# Patient Record
Sex: MALE | Race: WHITE | NOT HISPANIC OR LATINO | URBAN - METROPOLITAN AREA
[De-identification: names, ages, dates, MRNs, and addresses within clinical notes are randomized per-mention and may not be internally consistent; named-entity substitution may affect disease eponyms.]

---

## 2017-08-23 ENCOUNTER — EMERGENCY (EMERGENCY)
Facility: HOSPITAL | Age: 52
LOS: 0 days | Discharge: HOME | End: 2017-08-23

## 2017-08-23 DIAGNOSIS — L03.119 CELLULITIS OF UNSPECIFIED PART OF LIMB: ICD-10-CM

## 2017-08-23 DIAGNOSIS — M54.5 LOW BACK PAIN: ICD-10-CM

## 2017-08-23 DIAGNOSIS — Z79.899 OTHER LONG TERM (CURRENT) DRUG THERAPY: ICD-10-CM

## 2019-06-10 ENCOUNTER — OUTPATIENT (OUTPATIENT)
Dept: OUTPATIENT SERVICES | Facility: HOSPITAL | Age: 54
LOS: 1 days | Discharge: HOME | End: 2019-06-10
Payer: COMMERCIAL

## 2019-06-10 DIAGNOSIS — I25.10 ATHEROSCLEROTIC HEART DISEASE OF NATIVE CORONARY ARTERY WITHOUT ANGINA PECTORIS: ICD-10-CM

## 2019-06-10 PROCEDURE — 75574 CT ANGIO HRT W/3D IMAGE: CPT | Mod: 26

## 2019-06-21 ENCOUNTER — OUTPATIENT (OUTPATIENT)
Dept: OUTPATIENT SERVICES | Facility: HOSPITAL | Age: 54
LOS: 1 days | Discharge: HOME | End: 2019-06-21

## 2019-06-21 VITALS — WEIGHT: 240.08 LBS

## 2019-06-21 NOTE — CHART NOTE - NSCHARTNOTEFT_GEN_A_CORE
PRE-OP DIAGNOSIS: suspected CAD/Abnormal CCTA    PROCEDURE: [facility] with coronary angiography    Physician: APRIL Heredia MD  Assistant: Aj Lockwood    ANESTHESIA TYPE:  [ x ] Sedation  [ x ] Local/Regional    ESTIMATED BLOOD LOSS:    10   mL    CONDITION  [ x ]Good    IV CONTRAST:  60    mL    FINDINGS    Left Heart Catheterization:    Right dominant   Right radial 6 Fr   Radial D stat  LVEF%: 45  LVEDP: WNL  [ x ] Non-obstructive CAD                           Left main Normal    LAD: mild luminal irregularities, calcification                    Diag: Small vessel, mild ds    Left Circumflex: Large vessel, mild luminal irregularities  OM: Normal    Right Coronary Artery: Large vessel, prox 30%, calcification mild  RPDA mild luminal irregularities    POST-OP DIAGNOSIS  Non obstructive CAD    PLAN OF CARE  [x ] D/C Home today  c/w medical management PRE-OP DIAGNOSIS: suspected CAD/Abnormal CCTA    PROCEDURE: [facility] with coronary angiography    Physician: APRIL Heredia MD  Assistant: Aj Lockwood    ANESTHESIA TYPE:  [ x ] Sedation  [ x ] Local/Regional    ESTIMATED BLOOD LOSS:    10   mL    CONDITION  [ x ]Good    IV CONTRAST:  60    mL    FINDINGS    Left Heart Catheterization:    Right dominant   Right radial 6 Fr   Radial D stat  LVEF%: 45  LVEDP: WNL  [ x ] Non-obstructive CAD                           Left main Normal    LAD: mild luminal irregularities, calcification                    Diag: Small vessel, mild ds    Left Circumflex: Large vessel, mild luminal irregularities  OM: Normal    Right Coronary Artery: Large vessel, prox 30%, calcification mild  RPDA mild luminal irregularities    POST-OP DIAGNOSIS  Non obstructive CAD    PLAN OF CARE  [x ] D/C Home today  c/w medical management, Statins therapy

## 2019-06-21 NOTE — H&P CARDIOLOGY - HISTORY OF PRESENT ILLNESS
53 yrs old presented for elective LHC.  pt had CCTA and showed elevated calcium score.  VS WNL. 53 yrs old presented for elective LHC. strong fhx of CAD,   pt had CCTA and showed very high calcium score. 16oo Agatston score, about 600 in LAD, 600 in RCA and 390 in LCx  ex smoker  VS WNL.

## 2019-06-27 DIAGNOSIS — Z82.49 FAMILY HISTORY OF ISCHEMIC HEART DISEASE AND OTHER DISEASES OF THE CIRCULATORY SYSTEM: ICD-10-CM

## 2019-06-27 DIAGNOSIS — Z87.891 PERSONAL HISTORY OF NICOTINE DEPENDENCE: ICD-10-CM

## 2019-06-27 DIAGNOSIS — R07.89 OTHER CHEST PAIN: ICD-10-CM

## 2019-06-27 DIAGNOSIS — I25.10 ATHEROSCLEROTIC HEART DISEASE OF NATIVE CORONARY ARTERY WITHOUT ANGINA PECTORIS: ICD-10-CM

## 2020-06-24 PROBLEM — Z00.00 ENCOUNTER FOR PREVENTIVE HEALTH EXAMINATION: Status: ACTIVE | Noted: 2020-06-24

## 2020-06-25 ENCOUNTER — APPOINTMENT (OUTPATIENT)
Dept: SURGERY | Facility: CLINIC | Age: 55
End: 2020-06-25
Payer: COMMERCIAL

## 2020-06-25 VITALS — HEIGHT: 72 IN | BODY MASS INDEX: 33.59 KG/M2 | WEIGHT: 248 LBS

## 2020-06-25 PROCEDURE — 99203 OFFICE O/P NEW LOW 30 MIN: CPT

## 2020-06-25 NOTE — ASSESSMENT
[FreeTextEntry1] : Raffaele is a pleasant 54-year-old  and retired [employer] with a past medical history significant for hypercholesterolemia and psoriatic arthritis on Stelara SC every 12 weeks (last dose yesterday) presenting to the office referred by his wife Sis (who works at [employer]) complaining of pain and tenderness in the periumbilical region suspicious for a hernia. He occasionally does heavy lifting at work and enjoys lifting weights at home.\par \par Physical examination demonstrates a strawberry size tender bulge at the umbilicus which is not reducible consistent with a large incarcerated umbilical hernia warranting surgical repair. There is no evidence of strangulation and the patient denies any symptoms of obstruction. He does have a mild to moderate diastasis recti which is most likely related to his excess abdominal weight and of no significant clinical concern. His current BMI is 34.\par \par I explained the pros and cons of surgery, as well as all risks, benefits, indications and alternatives of the procedure and the patient understood and agreed. Raffaele was scheduled for the repair of his incarcerated umbilical hernia with mesh on Friday, August 7, 2020 (which represents the midpoint between his 2 closest Stelara injections) under LOCAL with IV SEDATION at [facility] with presurgical testing waived.  He was encouraged to avoid heavy lifting and strenuous activity in the interim, of course.

## 2020-06-25 NOTE — CONSULT LETTER
[Dear  ___] : Dear  [unfilled], [Courtesy Letter:] : I had the pleasure of seeing your patient, [unfilled], in my office today. [Please see my note below.] : Please see my note below. [Consult Closing:] : Thank you very much for allowing me to participate in the care of this patient.  If you have any questions, please do not hesitate to contact me. [FreeTextEntry3] : Respectfully,\par \par Uriel Lee M.D., FACS\par  [DrGeoffrey  ___] : Dr. TAYLOR [DrGeoffrey ___] : Dr. TAYOLR

## 2020-06-25 NOTE — PHYSICAL EXAM
[JVD] : no jugular venous distention  [Normal Breath Sounds] : Normal breath sounds [No Rash or Lesion] : No rash or lesion [Alert] : alert [Calm] : calm [de-identified] : overweight [de-identified] : mildly protuberant abdomen, mild diastasis recti\par  [de-identified] : incarcerated umbilical hernia [de-identified] : normal

## 2020-08-04 ENCOUNTER — LABORATORY RESULT (OUTPATIENT)
Age: 55
End: 2020-08-04

## 2020-08-04 ENCOUNTER — OUTPATIENT (OUTPATIENT)
Dept: OUTPATIENT SERVICES | Facility: HOSPITAL | Age: 55
LOS: 1 days | Discharge: HOME | End: 2020-08-04

## 2020-08-04 DIAGNOSIS — Z11.59 ENCOUNTER FOR SCREENING FOR OTHER VIRAL DISEASES: ICD-10-CM

## 2020-08-07 ENCOUNTER — APPOINTMENT (OUTPATIENT)
Dept: SURGERY | Facility: AMBULATORY SURGERY CENTER | Age: 55
End: 2020-08-07
Payer: COMMERCIAL

## 2020-08-07 ENCOUNTER — OUTPATIENT (OUTPATIENT)
Dept: OUTPATIENT SERVICES | Facility: HOSPITAL | Age: 55
LOS: 1 days | Discharge: HOME | End: 2020-08-07

## 2020-08-07 VITALS
DIASTOLIC BLOOD PRESSURE: 82 MMHG | HEART RATE: 72 BPM | WEIGHT: 250 LBS | HEIGHT: 72 IN | SYSTOLIC BLOOD PRESSURE: 140 MMHG | OXYGEN SATURATION: 96 % | TEMPERATURE: 98 F | RESPIRATION RATE: 18 BRPM

## 2020-08-07 VITALS
DIASTOLIC BLOOD PRESSURE: 75 MMHG | OXYGEN SATURATION: 94 % | HEART RATE: 55 BPM | RESPIRATION RATE: 16 BRPM | SYSTOLIC BLOOD PRESSURE: 117 MMHG

## 2020-08-07 DIAGNOSIS — Z98.890 OTHER SPECIFIED POSTPROCEDURAL STATES: Chronic | ICD-10-CM

## 2020-08-07 PROCEDURE — 49561: CPT

## 2020-08-07 PROCEDURE — 49568: CPT

## 2020-08-07 RX ORDER — HYDROMORPHONE HYDROCHLORIDE 2 MG/ML
0.5 INJECTION INTRAMUSCULAR; INTRAVENOUS; SUBCUTANEOUS
Refills: 0 | Status: DISCONTINUED | OUTPATIENT
Start: 2020-08-07 | End: 2020-08-07

## 2020-08-07 RX ORDER — ACETAMINOPHEN 500 MG
650 TABLET ORAL ONCE
Refills: 0 | Status: DISCONTINUED | OUTPATIENT
Start: 2020-08-07 | End: 2020-08-22

## 2020-08-07 RX ORDER — MORPHINE SULFATE 50 MG/1
2 CAPSULE, EXTENDED RELEASE ORAL
Refills: 0 | Status: DISCONTINUED | OUTPATIENT
Start: 2020-08-07 | End: 2020-08-07

## 2020-08-07 RX ORDER — USTEKINUMAB 45 MG/.5ML
0 INJECTION, SOLUTION SUBCUTANEOUS
Qty: 0 | Refills: 0 | DISCHARGE

## 2020-08-07 RX ORDER — SODIUM CHLORIDE 9 MG/ML
1000 INJECTION, SOLUTION INTRAVENOUS
Refills: 0 | Status: DISCONTINUED | OUTPATIENT
Start: 2020-08-07 | End: 2020-08-22

## 2020-08-07 RX ORDER — ONDANSETRON 8 MG/1
4 TABLET, FILM COATED ORAL ONCE
Refills: 0 | Status: DISCONTINUED | OUTPATIENT
Start: 2020-08-07 | End: 2020-08-22

## 2020-08-07 RX ORDER — TRAMADOL HYDROCHLORIDE 50 MG/1
1 TABLET ORAL
Qty: 30 | Refills: 0
Start: 2020-08-07 | End: 2020-08-11

## 2020-08-07 RX ADMIN — SODIUM CHLORIDE 100 MILLILITER(S): 9 INJECTION, SOLUTION INTRAVENOUS at 11:20

## 2020-08-07 NOTE — CHART NOTE - NSCHARTNOTEFT_GEN_A_CORE
PACU ANESTHESIA ADMISSION NOTE      Procedure: Repair of incarcerated ventral hernia with mesh    Post op diagnosis:  Incarcerated ventral hernia      ____  Intubated  TV:______       Rate: ______      FiO2: ______    __x__  Patent Airway    ___x_  Full return of protective reflexes    ___x_  Full recovery from anesthesia / back to baseline     Vitals:   T:  97.5         R:   16               BP: 130/80                 Sat:  98                 P: 72      Mental Status:  ___x_ Awake   ___x__ Alert   _____ Drowsy   _____ Sedated    Nausea/Vomiting:  _x___ NO  ______Yes,   See Post - Op Orders          Pain Scale (0-10):  __0___    Treatment: _x___ None    ____ See Post - Op/PCA Orders    Post - Operative Fluids:   ____ Oral   __x__ See Post - Op Orders    Plan: Discharge:   _x___Home       _____Floor     _____Critical Care    _____  Other:_________________    Comments:    Pt brought to RR spontaneously breathing, hemodynamically stable

## 2020-08-07 NOTE — ASU DISCHARGE PLAN (ADULT/PEDIATRIC) - CARE PROVIDER_API CALL
Uriel Lee  SURGERY  [address] [place] [address]  Phone: [phone]  Fax: [phone]  Scheduled Appointment: 08/18/2020

## 2020-08-12 DIAGNOSIS — K42.0 UMBILICAL HERNIA WITH OBSTRUCTION, WITHOUT GANGRENE: ICD-10-CM

## 2020-08-12 DIAGNOSIS — K43.6 OTHER AND UNSPECIFIED VENTRAL HERNIA WITH OBSTRUCTION, WITHOUT GANGRENE: ICD-10-CM

## 2020-08-18 ENCOUNTER — APPOINTMENT (OUTPATIENT)
Dept: SURGERY | Facility: CLINIC | Age: 55
End: 2020-08-18
Payer: COMMERCIAL

## 2020-08-18 DIAGNOSIS — K42.0 UMBILICAL HERNIA WITH OBSTRUCTION, W/OUT GANGRENE: ICD-10-CM

## 2020-08-18 DIAGNOSIS — E66.09 OTHER OBESITY DUE TO EXCESS CALORIES: ICD-10-CM

## 2020-08-18 PROCEDURE — 99024 POSTOP FOLLOW-UP VISIT: CPT

## 2020-08-18 NOTE — ASSESSMENT
[FreeTextEntry1] : Raffaele underwent the repair of his incarcerated infraumbilical ventral hernia with mesh on August 7, 2020 under local with IV sedation without any problems or complications. His wound is clean, dry and intact. There is no evidence of erythema, seroma formation or infection. He is tolerating a diet and having normal bowel movements. He denies any significant postoperative pain or discomfort at this time.\par \par He was counseled and reassured. Raffaele was discharged from the office with no specific followup necessary, but he knows to avoid any heavy lifting or strenuous activity for the next several weeks. We also discussed the importance of calorie restriction and healthy eating with regard to weight loss, hernia recurrence and his overall health. He was encouraged to continue to wear his abdominal binder for the better part of the next month, especially since he stopped wearing it on his own last week.

## 2020-08-18 NOTE — CONSULT LETTER
[FreeTextEntry1] : Dear Dr. Marek Sevilla, \par \par I had the pleasure of seeing your patient, YULISSA AMIN, in my office today. Please see my note below. \par \par Thank you very much for allowing me to participate in the care of this patient. If you have any questions, please do not hesitate to contact me. \par \par \par Respectfully,\par \par Uriel Lee M.D., FACS\par  \par \par \par \par cc: Dr. Yulissa Lomax \par Dr. Magnus Givens

## 2023-06-30 ENCOUNTER — NON-APPOINTMENT (OUTPATIENT)
Age: 58
End: 2023-06-30

## 2023-07-28 PROBLEM — Z87.2 PERSONAL HISTORY OF DISEASES OF THE SKIN AND SUBCUTANEOUS TISSUE: Chronic | Status: ACTIVE | Noted: 2020-08-07

## 2023-07-28 PROBLEM — G56.02 CARPAL TUNNEL SYNDROME, LEFT UPPER LIMB: Chronic | Status: ACTIVE | Noted: 2020-08-07

## 2023-07-28 PROBLEM — Z86.39 PERSONAL HISTORY OF OTHER ENDOCRINE, NUTRITIONAL AND METABOLIC DISEASE: Chronic | Status: ACTIVE | Noted: 2020-08-07

## 2023-08-01 ENCOUNTER — APPOINTMENT (OUTPATIENT)
Dept: PLASTIC SURGERY | Facility: CLINIC | Age: 58
End: 2023-08-01
Payer: COMMERCIAL

## 2023-08-01 VITALS — WEIGHT: 215 LBS | HEIGHT: 72 IN | BODY MASS INDEX: 29.12 KG/M2

## 2023-08-01 PROCEDURE — 99203 OFFICE O/P NEW LOW 30 MIN: CPT

## 2023-08-01 RX ORDER — USTEKINUMAB 130 MG/26ML
SOLUTION INTRAVENOUS
Refills: 0 | Status: ACTIVE | COMMUNITY

## 2023-08-01 NOTE — PHYSICAL EXAM
[NI] : Normal [de-identified] : right hand--FROM, mild tendnerness mid shazia, ulnar base of hand

## 2023-08-01 NOTE — ASSESSMENT
[FreeTextEntry1] : right 4th finger--triggers once each morning then not rest of day  offered steroid injection--pt defers  has psoratic arthriis on medication  no intervention for sequela of hand injury--no fx, resolving swelling  if triggering gets worse pt to return for steroid injection  all ?s asnwered
